# Patient Record
Sex: MALE | Race: WHITE
[De-identification: names, ages, dates, MRNs, and addresses within clinical notes are randomized per-mention and may not be internally consistent; named-entity substitution may affect disease eponyms.]

---

## 2019-01-01 ENCOUNTER — HOSPITAL ENCOUNTER (EMERGENCY)
Dept: HOSPITAL 25 - ED | Age: 19
Discharge: HOME | End: 2019-01-01
Payer: COMMERCIAL

## 2019-01-01 VITALS — SYSTOLIC BLOOD PRESSURE: 110 MMHG | DIASTOLIC BLOOD PRESSURE: 60 MMHG

## 2019-01-01 DIAGNOSIS — Z88.0: ICD-10-CM

## 2019-01-01 DIAGNOSIS — K52.9: Primary | ICD-10-CM

## 2019-01-01 LAB
ALBUMIN SERPL BCG-MCNC: 4.7 G/DL (ref 3.2–5.2)
ALBUMIN/GLOB SERPL: 1.7 {RATIO} (ref 1–3)
ALP SERPL-CCNC: 80 U/L (ref 34–104)
ALT SERPL W P-5'-P-CCNC: 40 U/L (ref 7–52)
ANION GAP SERPL CALC-SCNC: 11 MMOL/L (ref 2–11)
AST SERPL-CCNC: 74 U/L (ref 13–39)
BASOPHILS # BLD AUTO: 0 10^3/UL (ref 0–0.2)
BUN SERPL-MCNC: 20 MG/DL (ref 6–24)
BUN/CREAT SERPL: 17.9 (ref 8–20)
CALCIUM SERPL-MCNC: 10 MG/DL (ref 8.6–10.3)
CHLORIDE SERPL-SCNC: 100 MMOL/L (ref 101–111)
EOSINOPHIL # BLD AUTO: 0 10^3/UL (ref 0–0.6)
GLOBULIN SER CALC-MCNC: 2.7 G/DL (ref 2–4)
GLUCOSE SERPL-MCNC: 106 MG/DL (ref 70–100)
HCO3 SERPL-SCNC: 21 MMOL/L (ref 22–32)
HCT VFR BLD AUTO: 46 % (ref 42–52)
HGB BLD-MCNC: 16.4 G/DL (ref 14–18)
LYMPHOCYTES # BLD AUTO: 0.5 10^3/UL (ref 1–4.8)
MCH RBC QN AUTO: 31 PG (ref 27–31)
MCHC RBC AUTO-ENTMCNC: 36 G/DL (ref 31–36)
MCV RBC AUTO: 86 FL (ref 80–94)
MONOCYTES # BLD AUTO: 0.9 10^3/UL (ref 0–0.8)
NEUTROPHILS # BLD AUTO: 5.7 10^3/UL (ref 1.5–7.7)
NRBC # BLD AUTO: 0 10^3/UL
NRBC BLD QL AUTO: 0
PLATELET # BLD AUTO: 134 10^3/UL (ref 150–450)
POTASSIUM SERPL-SCNC: 4.3 MMOL/L (ref 3.5–5)
PROT SERPL-MCNC: 7.4 G/DL (ref 6.4–8.9)
RBC # BLD AUTO: 5.37 10^6/UL (ref 4–5.4)
SODIUM SERPL-SCNC: 132 MMOL/L (ref 135–145)
WBC # BLD AUTO: 7.1 10^3/UL (ref 3.5–10.8)

## 2019-01-01 PROCEDURE — 87651 STREP A DNA AMP PROBE: CPT

## 2019-01-01 PROCEDURE — 96374 THER/PROPH/DIAG INJ IV PUSH: CPT

## 2019-01-01 PROCEDURE — 96375 TX/PRO/DX INJ NEW DRUG ADDON: CPT

## 2019-01-01 PROCEDURE — 96361 HYDRATE IV INFUSION ADD-ON: CPT

## 2019-01-01 PROCEDURE — 36415 COLL VENOUS BLD VENIPUNCTURE: CPT

## 2019-01-01 PROCEDURE — 85025 COMPLETE CBC W/AUTO DIFF WBC: CPT

## 2019-01-01 PROCEDURE — 80053 COMPREHEN METABOLIC PANEL: CPT

## 2019-01-01 PROCEDURE — 86308 HETEROPHILE ANTIBODY SCREEN: CPT

## 2019-01-01 PROCEDURE — 99283 EMERGENCY DEPT VISIT LOW MDM: CPT

## 2019-01-01 NOTE — ED
HPI Febrile Illness





- HPI Summary


HPI Summary: 


Pt is a 17 y/o male who presents to the ED c/o fever. He began to have N/V, 

chills, fatigue, HA, wet cough, sore throat, mild neck pain, and mild CP with 

breathing 4 days ago. Last night, he had some mild diarrhea as well. As per 

mother, the vomiting and dry heaving starts in the morning, then returns in the 

evening for several hours. Pt denies any ear ache, sinus tenderness, or 

rhinorrhea. He did not get this seasons flu shot. Pt denies any hx of mono.








- History of Current Complaint


Chief Complaint: EDFluSymptoms


Time Seen by Provider: 01/01/19 19:32


Hx Obtained From: Patient, Family/Caretaker - Mother


Onset/Duration: Started Days Ago - 4, Still Present


Timing: Constant, Intermittent - N/V intermittent


Current Severity: Moderate


Pain Intensity: 4


Pain Scale Used: 0-10 Numeric


Aggravating Factors: Nothing


Alleviating Factors: Nothing


Associated Signs and Symptoms: Chills, Diarrhea, Headache, Nausea, Sore Throat, 

Vomiting





- Allergy/Home Medications


Allergies/Adverse Reactions: 


 Allergies











Allergy/AdvReac Type Severity Reaction Status Date / Time


 


Penicillins Allergy  Hives Verified 01/01/19 19:27














PMH/Surg Hx/FS Hx/Imm Hx


Endocrine/Hematology History: 


   Denies: Hx Diabetes


Cardiovascular History: 


   Denies: Hx Hypertension





- Cancer History


Cancer Type, Location and Year: None.





- Surgical History


Surgery Procedure, Year, and Place: None.


Infectious Disease History: No


Infectious Disease History: 


   Denies: Traveled Outside the [place] in Last 30 Days





- Family History


Known Family History: 


   Negative: Cardiac Disease, Hypertension, Diabetes





- Social History


Alcohol Use: None


Hx Substance Use: No


Substance Use Type: Reports: None


Hx Tobacco Use: No


Smoking Status (MU): Never Smoked Tobacco





Review of Systems


Positive: Fever, Chills, Fatigue


Positive: Sore Throat.  Negative: Ear Ache, Nasal Discharge, Other - sinus 

tenderness


Positive: Chest Pain - with breathing


Positive: Cough


Positive: Vomiting, Diarrhea, Nausea


Negative: Myalgia - neck pain


Positive: Headache


All Other Systems Reviewed And Are Negative: Yes





Physical Exam





- Summary


Physical Exam Summary: 


Appearance: Well appearing, no pain distress


Skin: warm, dry, reflects adequate perfusion


Head/face: normal


Eyes: EOMI, KAYLA


ENT: mucous membranes moist, mild redness and clear mucus in posterior pharynx, 

wax obscuring TMs, no sinus tenderness


Neck: supple, non-tender, no meningismus


Respiratory: CTA, breath sounds present


Cardiovascular: RRR, pulses symmetrical 


Abdomen: non-tender, soft, no RLQ tenderness


Bowel Sounds: hypoactive


Musculoskeletal: normal, strength/ROM intact


Neuro: normal, sensory motor intact, A&Ox3





Triage Information Reviewed: Yes


Vital Signs On Initial Exam: 


 Initial Vitals











Temp Pulse Resp BP Pulse Ox


 


 103.1 F   112   20   117/55   98 


 


 01/01/19 19:20  01/01/19 19:20  01/01/19 19:20  01/01/19 19:20  01/01/19 19:20











Vital Signs Reviewed: Yes





Diagnostics





- Vital Signs


 Vital Signs











  Temp Pulse Resp BP Pulse Ox


 


 01/01/19 19:20  103.1 F  112  20  117/55  98














- Laboratory


Result Diagrams: 


 01/01/19 19:54





 01/01/19 19:54


Lab Statement: Any lab studies that have been ordered have been reviewed, and 

results considered in the medical decision making process.





Re-Evaluation





- Re-Evaluation


  ** First Eval


Re-Evaluation Time: 20:35


Change: Improved


Comment: Pt feels better after medications.





Course/Dx





- Course


Course Of Treatment: Nurse's notes reviewed.  Patient with nausea vomiting and 

diarrhea and fever.  No meningismus.  No significant URI symptoms.  Negative 

for flu.  Mononucleosis test is negative as is CBC/CMP.  No right lower 

quadrant pain.  Hydrated here and treated for fever with improvement.  His 

nausea was improved with Zofran.  Continue similar outpatient.  Likely viral.





- Febrile Illness


Differential Diagnoses: GI Disease, Meningitis, Sepsis, Other: - Appendicitis, 

bacterial/viral gastroenteritis





- Diagnoses


Provider Diagnoses: 


 Gastroenteritis








Discharge





- Sign-Out/Discharge


Documenting (check all that apply): Patient Departure - Discharge





- Discharge Plan


Condition: Improved


Disposition: HOME


Prescriptions: 


Ondansetron ODT TAB* [Zofran 4 MG Odt TAB*] 4 mg PO Q8H PRN #12 tab.odt


 PRN Reason: Nausea


Patient Education Materials:  Gastroenteritis (ED)


Referrals: 


Ivan Lora MD [Primary Care Provider] - 


Additional Instructions: 


Stay well-hydrated.  Marvell diet as tolerated.  Drink plenty of fluids.  Tylenol

, ibuprofen as needed for fever.  Return with pain in the right lower abdomen, 

new symptoms, worse or other concerns.





- Billing Disposition and Condition


Condition: IMPROVED


Disposition: Home





- Attestation Statements


Document Initiated by Marina: Yes


Documenting Scribe: Suzette Lala


Provider For Whom Marina is Documenting (Include Credential): Eddie Lazar MD


Scribe Attestation: 


ISuzette, scribed for Eddie Lazar MD on 01/01/19 at 2221. 


Scribe Documentation Reviewed: Yes


Provider Attestation: 


The documentation as recorded by the Suzette feldman accurately reflects the 

service I personally performed and the decisions made by me, Eddie Lazar MD


Status of Scribe Document: Viewed